# Patient Record
Sex: FEMALE | Race: WHITE | NOT HISPANIC OR LATINO | URBAN - NONMETROPOLITAN AREA
[De-identification: names, ages, dates, MRNs, and addresses within clinical notes are randomized per-mention and may not be internally consistent; named-entity substitution may affect disease eponyms.]

---

## 2019-11-05 ENCOUNTER — IMPORTED ENCOUNTER (OUTPATIENT)
Dept: URBAN - NONMETROPOLITAN AREA CLINIC 1 | Facility: CLINIC | Age: 74
End: 2019-11-05

## 2019-11-05 PROCEDURE — 99213 OFFICE O/P EST LOW 20 MIN: CPT

## 2019-11-05 NOTE — PATIENT DISCUSSION
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 OD: reliable with scattered non-specific defects. OS: reliable with scattered non-specific defects. No glaucomatous defects noted at this time. - OCT done previously findings are stable by GPA OU.- Gonio done previously grade IV with light pigment OU. - IOP OD 12 and OS 11- Cup to Disc noted to be 0.7 OD and 0.5 OS. - Corneas noted to be thick (618 and 609). - Continue to monitor  - RTC 6 months F/U with VF and OCT Type II DM dx 2012- Discussed diagnosis in detail with patient- Stressed importance of good blood sugar control- Recommend no soda’s- Patient reports last A1C under 7- Patient states that she stopped taking Metformin about a month ago due to it upsetting her stomach. - Continue to monitorPVD OU: - Discussed findings of exam in detail with the patient. - The risk of retinal detachment in patients with PVDs was discussed with the patient and the warning signs of retinal detachment were carefully reviewed with the patient. - The patient was warned to return to the office or contact the ophthalmologist on call immediately if they experience signs of retinal detachment or changes in vision noted from today.  - Continue to monitor; 's Notes: MR DEFER 11/5/19 - AOADFE  1/2/18OCT disc 7/12/2018[facility] 3/9/15VF  7/12/2018Gonio 3/13/17PACH  7/12/18 - Blake Fountain

## 2019-11-08 PROBLEM — H26.493: Noted: 2019-11-08

## 2019-11-08 PROBLEM — H40.013: Noted: 2019-11-08

## 2019-11-08 PROBLEM — Z96.1: Noted: 2019-11-08

## 2019-11-08 PROBLEM — E11.9: Noted: 2019-11-08

## 2019-11-08 PROBLEM — H52.4: Noted: 2019-11-08

## 2019-11-08 PROBLEM — H43.811: Noted: 2018-01-02

## 2020-12-02 ENCOUNTER — IMPORTED ENCOUNTER (OUTPATIENT)
Dept: URBAN - NONMETROPOLITAN AREA CLINIC 1 | Facility: CLINIC | Age: 75
End: 2020-12-02

## 2020-12-02 PROBLEM — H02.413: Noted: 2020-12-02

## 2020-12-02 PROCEDURE — 92012 INTRM OPH EXAM EST PATIENT: CPT

## 2020-12-02 NOTE — PATIENT DISCUSSION
Ptosis-Ptosis (the upper eyelid being in a lower than normal position) of the upper eyelid was explained to the patient. -RBAs of ptosis repair discussed with patient. 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 Irais Frias and see if she is able to come off this and if so how long its safe.  Nonnie Shirts to call and let Rosaura tkno-MRD1- 0.5 OU -BLF - 14 OU -TBUT - 12 Secs OU (-) Lag  OU Rosaura to call patient and schedule task sent -Karoline Michaels of [employer] Given at [address] [address] [address]; 's Notes: MR JAVIER 11/5/19 - AOADFE  1/2/18OCT disc 7/12/2018[facility]os 3/9/15VF  7/12/2018Gonio 3/13/17PACH  7/12/18 - Rachel Weems

## 2022-03-29 ENCOUNTER — EMERGENCY VISIT (OUTPATIENT)
Dept: URBAN - NONMETROPOLITAN AREA CLINIC 1 | Facility: CLINIC | Age: 77
End: 2022-03-29

## 2022-03-29 DIAGNOSIS — H00.025: ICD-10-CM

## 2022-03-29 PROCEDURE — 92012 INTRM OPH EXAM EST PATIENT: CPT

## 2022-03-29 RX ORDER — CEPHALEXIN 500 MG/1: 1 CAPSULE ORAL TWICE A DAY

## 2022-03-29 ASSESSMENT — VISUAL ACUITY
OS_SC: 20/25-1
OD_SC: 20/20

## 2022-03-29 ASSESSMENT — TONOMETRY
OD_IOP_MMHG: 13
OS_IOP_MMHG: 13

## 2022-03-29 NOTE — PATIENT DISCUSSION
Recommend hot compresses 10-15 mins on and 45 mins off. start Keflex 500mg BID x 7 days, RX sent to pharmacy. Continue to monitor.

## 2022-04-09 ASSESSMENT — PACHYMETRY
OD_CT_UM: 597; ADJ: THICK
OS_CT_UM: 590; ADJ: THICK
OS_CT_UM: 590; ADJ: THICK
OD_CT_UM: 597; ADJ: THICK

## 2022-04-09 ASSESSMENT — VISUAL ACUITY
OU_SC: 20/20
OU_CC: 20/20-
OD_CC: 20/30-1
OS_CC: 20/29
OD_CC: 20/32-
OS_CC: 20/30-2

## 2022-04-09 ASSESSMENT — TONOMETRY
OS_IOP_MMHG: 11
OD_IOP_MMHG: 12

## 2023-10-02 ENCOUNTER — EMERGENCY VISIT (OUTPATIENT)
Dept: URBAN - NONMETROPOLITAN AREA CLINIC 1 | Facility: CLINIC | Age: 78
End: 2023-10-02

## 2023-10-02 DIAGNOSIS — H26.493: ICD-10-CM

## 2023-10-02 PROCEDURE — 99214 OFFICE O/P EST MOD 30 MIN: CPT

## 2023-10-02 ASSESSMENT — VISUAL ACUITY
OS_SC: 20/32-2
OD_BAT: 20/50-1
OS_BAT: 20/50-1
OU_SC: 20/29-2
OD_SC: 20/32

## 2023-10-02 ASSESSMENT — TONOMETRY
OD_IOP_MMHG: 13
OS_IOP_MMHG: 13

## 2023-11-01 ENCOUNTER — CONSULTATION/EVALUATION (OUTPATIENT)
Dept: URBAN - NONMETROPOLITAN AREA CLINIC 1 | Facility: CLINIC | Age: 78
End: 2023-11-01

## 2023-11-01 DIAGNOSIS — H26.493: ICD-10-CM

## 2023-11-01 PROCEDURE — 99214 OFFICE O/P EST MOD 30 MIN: CPT

## 2023-11-01 PROCEDURE — 66821 AFTER CATARACT LASER SURGERY: CPT

## 2023-11-01 ASSESSMENT — VISUAL ACUITY
OD_SC: 20/30
OS_SC: 20/40
OD_BAT: 20/60
OS_BAT: 20/70
OS_PH: 20/30
OU_SC: 20/30

## 2023-11-01 ASSESSMENT — TONOMETRY
OD_IOP_MMHG: 14
OS_IOP_MMHG: 14

## 2023-11-09 ENCOUNTER — POST-OP (OUTPATIENT)
Dept: URBAN - NONMETROPOLITAN AREA CLINIC 1 | Facility: CLINIC | Age: 78
End: 2023-11-09

## 2023-11-09 DIAGNOSIS — Z98.890: ICD-10-CM

## 2023-11-09 PROCEDURE — 99024 POSTOP FOLLOW-UP VISIT: CPT

## 2023-11-09 ASSESSMENT — VISUAL ACUITY
OD_SC: 20/40
OS_SC: 20/32-1
OD_PH: 20/25

## 2023-11-09 ASSESSMENT — TONOMETRY
OD_IOP_MMHG: 16
OS_IOP_MMHG: 16